# Patient Record
Sex: MALE | Race: WHITE | HISPANIC OR LATINO | URBAN - METROPOLITAN AREA
[De-identification: names, ages, dates, MRNs, and addresses within clinical notes are randomized per-mention and may not be internally consistent; named-entity substitution may affect disease eponyms.]

---

## 2019-04-02 ENCOUNTER — EMERGENCY (EMERGENCY)
Facility: HOSPITAL | Age: 59
LOS: 1 days | Discharge: HOME | End: 2019-04-02
Attending: EMERGENCY MEDICINE
Payer: MEDICAID

## 2019-04-02 VITALS
HEIGHT: 68 IN | TEMPERATURE: 98 F | SYSTOLIC BLOOD PRESSURE: 170 MMHG | WEIGHT: 182.1 LBS | HEART RATE: 56 BPM | RESPIRATION RATE: 18 BRPM | DIASTOLIC BLOOD PRESSURE: 80 MMHG | OXYGEN SATURATION: 97 %

## 2019-04-02 PROCEDURE — 99285 EMERGENCY DEPT VISIT HI MDM: CPT

## 2019-04-02 PROCEDURE — 96374 THER/PROPH/DIAG INJ IV PUSH: CPT

## 2019-04-02 PROCEDURE — T1013: CPT

## 2019-04-02 PROCEDURE — 99284 EMERGENCY DEPT VISIT MOD MDM: CPT | Mod: 25

## 2019-04-02 RX ADMIN — Medication 100 MILLIGRAM(S): at 21:17

## 2019-04-02 NOTE — ED PROVIDER NOTE - OBJECTIVE STATEMENT
Pt is a 58y M with abrasion and cellulitis to the L upper eyelid. Pt states that he is a  and was cutting grass when a small branch kicked back and hit him on the upper eyelid. Pt states he had a small scratch but there was no swelling for the first few days. as time progressed pt states that he started having redness and swelling to the area. He states it has been getting worse. He denies any injury to the eyeball/visual changes/ fever. He has not taken any medication at home. He is UTD on tetanus. No other complaints. EOMI.

## 2019-04-02 NOTE — ED PROVIDER NOTE - CHPI ED SYMPTOMS NEG
no itching/no photophobia/no purulent drainage/no blurred vision/no double vision/no drainage/no discharge

## 2019-10-29 NOTE — ED PROVIDER NOTE - THROAT, MLM
uvula midline, no vesicles, no redness, and no oropharyngeal exudate. verbal instruction/individual instruction/written material

## 2020-04-07 ENCOUNTER — EMERGENCY (EMERGENCY)
Facility: HOSPITAL | Age: 60
LOS: 1 days | Discharge: HOME | End: 2020-04-07
Attending: EMERGENCY MEDICINE
Payer: SELF-PAY

## 2020-04-07 VITALS
HEART RATE: 78 BPM | WEIGHT: 199.96 LBS | RESPIRATION RATE: 19 BRPM | TEMPERATURE: 103 F | HEIGHT: 68 IN | DIASTOLIC BLOOD PRESSURE: 70 MMHG | SYSTOLIC BLOOD PRESSURE: 128 MMHG | OXYGEN SATURATION: 94 %

## 2020-04-07 PROCEDURE — 99284 EMERGENCY DEPT VISIT MOD MDM: CPT

## 2020-04-07 NOTE — ED STATDOCS - PHYSICAL EXAMINATION
Gen: No acute distress, non toxic  HEENT: Mucous membranes moist, pink conjunctivae, EOMI  Neck: supple.   CV: RRR, nl s1/s2.  Resp: CTAB, normal rate and effort, speaking full sentences, no retractions.   GI: Abdomen soft, NT, ND. No rebound, no guarding  : No CVAT  Neuro: A&O x 3, moving all 4 extremities  MSK: No spine or joint tenderness to palpation  Skin: No rashes. intact and perfused, warm

## 2020-04-07 NOTE — ED STATDOCS - NSFOLLOWUPINSTRUCTIONS_ED_ALL_ED_FT
- Follow up with your doctor within 2-3 days.   - Take Tylenol (Acetaminophen) 650mg or Motrin (Ibuprofen/Advil) 600mg every 6 hours as needed for pain.   - Stay well hydrated.   - See attached COVID-19 instructions.      Plan to self-quarantine yourself. Avoid contact with others. Wash your hands frequently with soap and warm water for at least 20 seconds. If you develop worsening symptoms- such as respiratory distress, confusion, severe weakness, or anything new or concerning, please return to the emergency department to be evaluated.

## 2020-04-07 NOTE — ED STATDOCS - OBJECTIVE STATEMENT
58 y/o male no pmh c/o 2 weeks of fever, chills, mild headache, itchy/sore throat, myalgias and cough. States not progressing, just constant so wanted to be evaluated. Denies cp, sob, abd pain, exertional symptoms, meningeal symptoms, urinary symptoms. Taking ibuprofen intermittently, nothing recently.     ROS: + fever/chills. No eye pain/changes in vision, No ear pain/dysphagia, No chest pain/palpitations. No SOB No abdominal pain, N/V/D, no black/bloody bm. No dysuria/frequency/discharge, + headache. No Dizziness.    No rashes or breaks in skin. No numbness/tingling/weakness.

## 2020-04-07 NOTE — ED STATDOCS - PATIENT PORTAL LINK FT
You can access the FollowMyHealth Patient Portal offered by [facility] by registering at the following website: [email or web address]. By joining [employer]’s FollowMyHealth portal, you will also be able to view your health information using other applications (apps) compatible with our system.

## 2020-04-07 NOTE — ED STATDOCS - CLINICAL SUMMARY MEDICAL DECISION MAKING FREE TEXT BOX
pt with likely covid, 2 weeks of symptoms, no resp complaints, satting 94-96% on room air, no exertional desat/difficulty breathing. no change in symptoms. hydrated, otherwise well. febrile. instructed likely has covid, no testing given rationing resources.  instructed on self isolation and return precautions with supportive care.

## 2020-04-08 PROBLEM — Z78.9 OTHER SPECIFIED HEALTH STATUS: Chronic | Status: ACTIVE | Noted: 2019-04-02

## 2021-05-21 ENCOUNTER — EMERGENCY (EMERGENCY)
Facility: HOSPITAL | Age: 61
LOS: 1 days | Discharge: HOME | End: 2021-05-21
Attending: EMERGENCY MEDICINE
Payer: COMMERCIAL

## 2021-05-21 VITALS
TEMPERATURE: 98 F | SYSTOLIC BLOOD PRESSURE: 152 MMHG | OXYGEN SATURATION: 97 % | WEIGHT: 197.98 LBS | DIASTOLIC BLOOD PRESSURE: 85 MMHG | HEART RATE: 60 BPM | HEIGHT: 68 IN | RESPIRATION RATE: 16 BRPM

## 2021-05-21 PROCEDURE — 99283 EMERGENCY DEPT VISIT LOW MDM: CPT | Mod: 25

## 2021-05-21 PROCEDURE — 73564 X-RAY EXAM KNEE 4 OR MORE: CPT | Mod: 26,RT

## 2021-05-21 PROCEDURE — 73564 X-RAY EXAM KNEE 4 OR MORE: CPT

## 2021-05-21 PROCEDURE — 99283 EMERGENCY DEPT VISIT LOW MDM: CPT

## 2021-05-21 RX ORDER — IBUPROFEN 200 MG
600 TABLET ORAL ONCE
Refills: 0 | Status: COMPLETED | OUTPATIENT
Start: 2021-05-21 | End: 2021-05-21

## 2021-05-21 RX ORDER — IBUPROFEN 200 MG
1 TABLET ORAL
Qty: 20 | Refills: 0
Start: 2021-05-21 | End: 2021-05-25

## 2021-05-21 RX ADMIN — Medication 600 MILLIGRAM(S): at 15:36

## 2021-05-21 NOTE — ED PROVIDER NOTE - NS ED ROS FT
ROS: CONTUSIONAL: Denies fever, chills, fatigue, wt loss. HEAD: Denies trauma, HA, Dizziness. EYE: Denies Acute visual changes, diplopia. ENMT: Denies change in hearing, tinnitus, epistaxis, difficulty swallowing, sore throat. CARDIO: Denies CP, palpitations, edema. RESP: Denies Cough, SOB , Diff breathing, hemoptysis. GI: Denies N/V, ABD pain, change in bowel movement. URINARY: Denies difficulty urinating, pelvic pain. MS: +joint pain  Denies back pain, weakness, decreased ROM, swelling. NEURO: Denies change in gait, seizures, loss of sensation, dizziness, confusion LOC.  PSY: NO SI/HI. SKIN: Denies Rash, bruising.

## 2021-05-21 NOTE — ED PROVIDER NOTE - OBJECTIVE STATEMENT
PT with no SPMHx presents to the ED with complaint of Rt knee pain that started 1 hr PTA. Pt states that he was walking down stairs when he slipped his knee buckled and he had a sudden onset of pain in his Rt knee. Pt states that pain is moderate, dull, non radiating, constant, made worse with activity improved with rest. Pt dines fall to the ground, HA, dizziness, SOB, diff breathing, weakness, numbness, tingling, cough, SOB.

## 2021-05-21 NOTE — ED PROVIDER NOTE - ADDITIONAL NOTES AND INSTRUCTIONS:
PT was evaluated At [facility] ED and was found to have a condition that warranted time of to rest and heal from WORK/SCHOOL.   Rebel Phelan PA-C

## 2021-05-21 NOTE — ED PROVIDER NOTE - CARE PROVIDER_API CALL
Luis Ling)  Orthopaedic Surgery  [address] [place] [address]  Phone: [phone]  Fax: [phone]  Follow Up Time:

## 2021-05-21 NOTE — ED PROVIDER NOTE - PROGRESS NOTE DETAILS
Rebel Phelan PA-C: pt informed of possibility of change in radiology read after official read, PT verbalizes that he understands results.

## 2021-05-21 NOTE — ED PROVIDER NOTE - PATIENT PORTAL LINK FT
You can access the FollowMyHealth Patient Portal offered by [facility] by registering at the following website: [email or web address]. By joining [employer]’s FollowMyHealth portal, you will also be able to view your health information using other applications (apps) compatible with our system.

## 2021-05-21 NOTE — ED PROVIDER NOTE - NSFOLLOWUPINSTRUCTIONS_ED_ALL_ED_FT
Educación para el paciente: Dolor de rodilla (Conceptos Básicos)  View in English  Redactado por los médicos y editores de [employer]  ¿Cuál es la causa del dolor de rodilla?  Muchos padecimientos diferentes pueden causar dolor en las rodillas. Algunos de los más comunes se enumeran a continuación.    ?Doblar o usar mucho la rodilla – [place] puede causar un dolor en la parte delantera de la rodilla que empeora cuando corre, sube escaleras o se sienta por mucho tiempo.    ?Artritis – La artritis es un término general que significa inflamación de las articulaciones. Existen muchos tipos de artritis. El tipo más común, llamado osteoartritis, con frecuencia aparece con la edad y puede causar dolor, rigidez e inflamación (figura 1).    ?Bursitis – La bursitis se produce cuando los sacos llenos de líquido de alrededor de la rodilla (llamados “bolsas”) se irritan o se inflaman (figura 2). La bursitis puede causar dolor e inflamación.    ?Acumulación de líquido en la rodilla – [place] puede suceder después de silvia lesión de rodilla.    ?Rotura de menisco – El menisco es silvia almohadilla de material gomoso (cartílago) que se encuentra entre el hueso del muslo y el hueso de la pierna (figura 3).    ?Rotura de ligamento – Los ligamentos son bandas de tejido que conectan los huesos entre sí. Cada rodilla cuenta con 4 ligamentos (figura 3).    ?Torcedura de músculo – Varios músculos de la pierna mueven la articulación de la rodilla y hacen que la rodilla se doble y se enderece. Si january de estos músculos no funciona clement,  la rodilla puede causar dolor.    ?Otras lesiones de rodilla, silvia infección de la articulación de la rodilla o un padecimiento llamado gota, que provoca la formación de deanne en el interior de las articulaciones.    ?Padecimientos que no están relacionados con la rodilla – Por ejemplo, los problemas de cadera a veces pueden causar dolor de rodilla.    ¿Hay algo que pueda hacer por mi cuenta para sentirme mejor?  Sí. Para aliviar los síntomas, puede hacer lo siguiente:    ?Colocar hielo en la rodilla para disminuir el dolor y la inflamación – En las primeras semanas después de silvia lesión, o después de silvia actividad que empeore el dolor, puede tratar de colocarse hielo en la rodilla. Coloque un paquete de gel frío, silvia bolsa de hielo o silvia bolsa de verduras congeladas en la trupti lesionada cada 1 a 2 horas, randy 15 minutos cada vez. Coloque silvia toalla delgada entre el hielo (o el objeto frío) y rios piel. Para disminuir la inflamación, siéntese o acuéstese, y levante la pierna por encima del nivel del corazón cuando se coloque hielo.    ?Descansar la rodilla y evitar movimientos que empeoren el dolor – Trate de no agacharse, arrodillarse ni correr. Tampoco use máquinas de ejercicio, marissa escaladoras o máquinas de krista. En lugar de eso, puede caminar o nadar (en estilo crol o espalda) para hacer ejercicio.    ?Jeana silvia medicina para aliviar el dolor, marissa el paracetamol (acetaminofén) (ejemplo de parvez comercial: Tylenol) o el ibuprofeno (ejemplos de marcas comerciales: Advil, Motrin).    ¿Alberta consultar a un médico o enfermero?  Consulte a rios médico o enfermero si:    ?No puede aplicar peso a la rodilla o la rodilla se le traba o se flexiona involuntariamente    ?La rodilla está muy inflamada y siente mucho dolor    ?Tiene fiebre, dolor, inflamación y enrojecimiento en la rodilla    ?El dolor de rodilla no mejora o empeora después de tratarlo por rios cuenta randy unos días    ¿Cómo se trata el dolor de rodilla?  El tratamiento adecuado para el dolor de rodilla depende de lo que lo cause. Los tratamientos podrían incluir:    ?Usar silvia rodillera o plantillas en el calzado    ?Hacer ejercicios para fortalecer y estirar los músculos que mueven la articulación de la rodilla – Consulte a rios médico o enfermero sobre cuáles son los ejercicios que pueden ayudarlo con la causa del dolor.    ?Fisioterapia    ?Recibir silvia inyección de medicina en la rodilla    ?Otras medicinas    ?Cirugía

## 2021-05-21 NOTE — ED PROVIDER NOTE - CLINICAL SUMMARY MEDICAL DECISION MAKING FREE TEXT BOX
PT with stable VS, no acute distress, non toxic appearing, tolerating PO in the ED, Pt with no direct trauma to knee moody injury unlikely, xays neg, Pt educated that symptoms likely due to ligamentous Vs tendonous injury and the need to follow up to orhto, supportive care, crutches with modified wt bearing, educated about when to return to the ED if needed. PT verbalizes that he understands all instructions and results. Pt informed that ED is open and available 24/7 365 days a yr, encouraged to return to the ED if they have any change in condition, or feel the need for revaluation.    utilized to obtain History, ROS, Physical Exam, explanations of results and plan of care, as well as follow up instructions.

## 2021-05-21 NOTE — ED PROVIDER NOTE - ATTENDING CONTRIBUTION TO CARE
Rt knee pain that started 1 hr PTA. Pt states that he was walking down stairs when he slipped his knee buckled and he had a sudden onset of pain in his Rt knee. Pt states that pain is moderate, dull, non radiating, constant, made worse with activity improved with rest. Pt dines fall to the ground, HA, dizziness, SOB, diff breathing, weakness, numbness, tingling, cough, SOB.   xray    analgesia
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
## 2022-11-09 ENCOUNTER — OFFICE (OUTPATIENT)
Dept: URBAN - METROPOLITAN AREA CLINIC 109 | Facility: CLINIC | Age: 62
Setting detail: OPHTHALMOLOGY
End: 2022-11-09
Payer: COMMERCIAL

## 2022-11-09 DIAGNOSIS — H40.1121: ICD-10-CM

## 2022-11-09 PROCEDURE — 65855 TRABECULOPLASTY LASER SURG: CPT | Performed by: OPHTHALMOLOGY

## 2022-11-09 ASSESSMENT — TONOMETRY: OS_IOP_MMHG: 15

## 2022-11-09 ASSESSMENT — CONFRONTATIONAL VISUAL FIELD TEST (CVF)
OD_FINDINGS: FULL
OS_FINDINGS: FULL

## 2022-11-09 ASSESSMENT — VISUAL ACUITY
OS_BCVA: 20/20
OD_BCVA: 20/20-2

## 2023-01-14 ENCOUNTER — OFFICE (OUTPATIENT)
Dept: URBAN - METROPOLITAN AREA CLINIC 109 | Facility: CLINIC | Age: 63
Setting detail: OPHTHALMOLOGY
End: 2023-01-14
Payer: COMMERCIAL

## 2023-01-14 DIAGNOSIS — H40.1132: ICD-10-CM

## 2023-01-14 PROCEDURE — 99213 OFFICE O/P EST LOW 20 MIN: CPT | Performed by: OPHTHALMOLOGY

## 2023-01-14 ASSESSMENT — TONOMETRY
OD_IOP_MMHG: 15
OS_IOP_MMHG: 14

## 2023-01-14 ASSESSMENT — VISUAL ACUITY
OS_BCVA: 20/20
OD_BCVA: 20/20

## 2023-01-14 ASSESSMENT — CONFRONTATIONAL VISUAL FIELD TEST (CVF)
OD_FINDINGS: FULL
OS_FINDINGS: FULL

## 2023-03-18 ENCOUNTER — OFFICE (OUTPATIENT)
Dept: URBAN - METROPOLITAN AREA CLINIC 109 | Facility: CLINIC | Age: 63
Setting detail: OPHTHALMOLOGY
End: 2023-03-18
Payer: COMMERCIAL

## 2023-03-18 DIAGNOSIS — H25.13: ICD-10-CM

## 2023-03-18 DIAGNOSIS — H16.223: ICD-10-CM

## 2023-03-18 DIAGNOSIS — H40.1132: ICD-10-CM

## 2023-03-18 PROCEDURE — 99213 OFFICE O/P EST LOW 20 MIN: CPT | Performed by: OPHTHALMOLOGY

## 2023-03-18 ASSESSMENT — VISUAL ACUITY
OS_BCVA: 20/20
OD_BCVA: 20/20-2

## 2023-03-18 ASSESSMENT — CONFRONTATIONAL VISUAL FIELD TEST (CVF)
OD_FINDINGS: FULL
OS_FINDINGS: FULL

## 2023-03-18 ASSESSMENT — SUPERFICIAL PUNCTATE KERATITIS (SPK)
OD_SPK: 1+
OS_SPK: 1+

## 2023-05-20 ENCOUNTER — OFFICE (OUTPATIENT)
Dept: URBAN - METROPOLITAN AREA CLINIC 109 | Facility: CLINIC | Age: 63
Setting detail: OPHTHALMOLOGY
End: 2023-05-20
Payer: COMMERCIAL

## 2023-05-20 DIAGNOSIS — H40.1132: ICD-10-CM

## 2023-05-20 DIAGNOSIS — H25.13: ICD-10-CM

## 2023-05-20 PROCEDURE — 92012 INTRM OPH EXAM EST PATIENT: CPT | Performed by: OPHTHALMOLOGY

## 2023-05-20 PROCEDURE — 92083 EXTENDED VISUAL FIELD XM: CPT | Performed by: OPHTHALMOLOGY

## 2023-05-20 PROCEDURE — 92133 CPTRZD OPH DX IMG PST SGM ON: CPT | Performed by: OPHTHALMOLOGY

## 2023-05-20 ASSESSMENT — VISUAL ACUITY
OD_BCVA: 20/25-1
OS_BCVA: 20/25-1

## 2023-05-20 ASSESSMENT — TONOMETRY
OD_IOP_MMHG: 16
OS_IOP_MMHG: 16

## 2023-05-20 ASSESSMENT — SUPERFICIAL PUNCTATE KERATITIS (SPK)
OS_SPK: 1+
OD_SPK: 1+

## 2023-05-20 ASSESSMENT — CONFRONTATIONAL VISUAL FIELD TEST (CVF)
OS_FINDINGS: FULL
OD_FINDINGS: FULL

## 2023-08-19 ENCOUNTER — OFFICE (OUTPATIENT)
Dept: URBAN - METROPOLITAN AREA CLINIC 109 | Facility: CLINIC | Age: 63
Setting detail: OPHTHALMOLOGY
End: 2023-08-19
Payer: COMMERCIAL

## 2023-08-19 DIAGNOSIS — H40.1132: ICD-10-CM

## 2023-08-19 DIAGNOSIS — H25.13: ICD-10-CM

## 2023-08-19 PROCEDURE — 99213 OFFICE O/P EST LOW 20 MIN: CPT | Performed by: OPHTHALMOLOGY

## 2023-08-19 PROCEDURE — 92250 FUNDUS PHOTOGRAPHY W/I&R: CPT | Performed by: OPHTHALMOLOGY

## 2023-08-19 ASSESSMENT — CONFRONTATIONAL VISUAL FIELD TEST (CVF)
OS_FINDINGS: FULL
OD_FINDINGS: FULL

## 2023-08-19 ASSESSMENT — SUPERFICIAL PUNCTATE KERATITIS (SPK)
OS_SPK: 1+
OD_SPK: 1+

## 2023-08-19 ASSESSMENT — TONOMETRY
OS_IOP_MMHG: 15
OD_IOP_MMHG: 15

## 2023-08-19 ASSESSMENT — VISUAL ACUITY
OS_BCVA: 20/20
OD_BCVA: 20/20

## 2023-09-16 ENCOUNTER — OFFICE (OUTPATIENT)
Dept: URBAN - METROPOLITAN AREA CLINIC 109 | Facility: CLINIC | Age: 63
Setting detail: OPHTHALMOLOGY
End: 2023-09-16
Payer: COMMERCIAL

## 2023-09-16 DIAGNOSIS — H25.13: ICD-10-CM

## 2023-09-16 DIAGNOSIS — H40.1132: ICD-10-CM

## 2023-09-16 DIAGNOSIS — H10.45: ICD-10-CM

## 2023-09-16 DIAGNOSIS — H16.221: ICD-10-CM

## 2023-09-16 PROBLEM — H16.222 DRY EYE SYNDROME K SICCA; RIGHT EYE, LEFT EYE: Status: ACTIVE | Noted: 2023-09-16

## 2023-09-16 PROCEDURE — 92083 EXTENDED VISUAL FIELD XM: CPT | Performed by: OPHTHALMOLOGY

## 2023-09-16 PROCEDURE — 68761 CLOSE TEAR DUCT OPENING: CPT | Performed by: OPHTHALMOLOGY

## 2023-09-16 PROCEDURE — 99213 OFFICE O/P EST LOW 20 MIN: CPT | Performed by: OPHTHALMOLOGY

## 2023-09-16 PROCEDURE — 92133 CPTRZD OPH DX IMG PST SGM ON: CPT | Performed by: OPHTHALMOLOGY

## 2023-09-16 ASSESSMENT — TONOMETRY
OS_IOP_MMHG: 17
OD_IOP_MMHG: 16

## 2023-09-16 ASSESSMENT — CONFRONTATIONAL VISUAL FIELD TEST (CVF)
OS_FINDINGS: FULL
OD_FINDINGS: FULL

## 2023-09-16 ASSESSMENT — VISUAL ACUITY
OS_BCVA: 20/20
OD_BCVA: 20/20

## 2023-09-16 ASSESSMENT — PUNCTA - ASSESSMENT: OD_PUNCTA: 3MON PLUG

## 2023-09-16 ASSESSMENT — SUPERFICIAL PUNCTATE KERATITIS (SPK)
OD_SPK: 1+
OS_SPK: 1+

## 2023-12-09 ENCOUNTER — OFFICE (OUTPATIENT)
Dept: URBAN - METROPOLITAN AREA CLINIC 109 | Facility: CLINIC | Age: 63
Setting detail: OPHTHALMOLOGY
End: 2023-12-09
Payer: COMMERCIAL

## 2023-12-09 DIAGNOSIS — H16.223: ICD-10-CM

## 2023-12-09 DIAGNOSIS — H40.1132: ICD-10-CM

## 2023-12-09 PROCEDURE — 99213 OFFICE O/P EST LOW 20 MIN: CPT | Mod: 25 | Performed by: OPHTHALMOLOGY

## 2023-12-09 PROCEDURE — 68761 CLOSE TEAR DUCT OPENING: CPT | Mod: E2,E4 | Performed by: OPHTHALMOLOGY

## 2023-12-09 ASSESSMENT — CONFRONTATIONAL VISUAL FIELD TEST (CVF)
OD_FINDINGS: FULL
OS_FINDINGS: FULL

## 2023-12-09 ASSESSMENT — PUNCTA - ASSESSMENT: OD_PUNCTA: 3MON PLUG

## 2023-12-09 ASSESSMENT — SUPERFICIAL PUNCTATE KERATITIS (SPK)
OD_SPK: 1+
OS_SPK: 1+

## 2024-03-02 ENCOUNTER — OFFICE (OUTPATIENT)
Dept: URBAN - METROPOLITAN AREA CLINIC 109 | Facility: CLINIC | Age: 64
Setting detail: OPHTHALMOLOGY
End: 2024-03-02
Payer: COMMERCIAL

## 2024-03-02 DIAGNOSIS — H25.13: ICD-10-CM

## 2024-03-02 DIAGNOSIS — H10.45: ICD-10-CM

## 2024-03-02 DIAGNOSIS — H40.1132: ICD-10-CM

## 2024-03-02 PROCEDURE — 92083 EXTENDED VISUAL FIELD XM: CPT | Performed by: OPHTHALMOLOGY

## 2024-03-02 PROCEDURE — 92012 INTRM OPH EXAM EST PATIENT: CPT | Performed by: OPHTHALMOLOGY

## 2024-03-02 PROCEDURE — 92133 CPTRZD OPH DX IMG PST SGM ON: CPT | Performed by: OPHTHALMOLOGY

## 2024-06-22 ENCOUNTER — OFFICE (OUTPATIENT)
Dept: URBAN - METROPOLITAN AREA CLINIC 109 | Facility: CLINIC | Age: 64
Setting detail: OPHTHALMOLOGY
End: 2024-06-22
Payer: COMMERCIAL

## 2024-06-22 DIAGNOSIS — H16.223: ICD-10-CM

## 2024-06-22 DIAGNOSIS — H25.13: ICD-10-CM

## 2024-06-22 DIAGNOSIS — H40.1132: ICD-10-CM

## 2024-06-22 PROCEDURE — 68761 CLOSE TEAR DUCT OPENING: CPT | Mod: 50 | Performed by: OPHTHALMOLOGY

## 2024-06-22 PROCEDURE — 99213 OFFICE O/P EST LOW 20 MIN: CPT | Mod: 25 | Performed by: OPHTHALMOLOGY

## 2024-06-22 ASSESSMENT — CONFRONTATIONAL VISUAL FIELD TEST (CVF)
OD_FINDINGS: FULL
OS_FINDINGS: FULL

## 2024-10-19 ENCOUNTER — OFFICE (OUTPATIENT)
Dept: URBAN - METROPOLITAN AREA CLINIC 109 | Facility: CLINIC | Age: 64
Setting detail: OPHTHALMOLOGY
End: 2024-10-19
Payer: COMMERCIAL

## 2024-10-19 DIAGNOSIS — H16.223: ICD-10-CM

## 2024-10-19 DIAGNOSIS — H40.1132: ICD-10-CM

## 2024-10-19 DIAGNOSIS — H25.13: ICD-10-CM

## 2024-10-19 PROBLEM — H11.153 PINGUECULA; BOTH EYES: Status: ACTIVE | Noted: 2024-10-19

## 2024-10-19 PROCEDURE — 68761 CLOSE TEAR DUCT OPENING: CPT | Mod: 50 | Performed by: OPHTHALMOLOGY

## 2024-10-19 PROCEDURE — 99213 OFFICE O/P EST LOW 20 MIN: CPT | Mod: 25 | Performed by: OPHTHALMOLOGY

## 2024-10-19 ASSESSMENT — KERATOMETRY
OS_K2POWER_DIOPTERS: 43.50
OD_AXISANGLE_DEGREES: 096
OS_K1POWER_DIOPTERS: 42.25
OS_AXISANGLE_DEGREES: 084
OD_K1POWER_DIOPTERS: 42.00
OD_K2POWER_DIOPTERS: 43.25

## 2024-10-19 ASSESSMENT — REFRACTION_AUTOREFRACTION
OD_SPHERE: +0.25
OD_AXIS: 53
OD_CYLINDER: -0.75
OS_SPHERE: 0.00
OS_AXIS: 172
OS_CYLINDER: -1.00

## 2024-10-19 ASSESSMENT — PUNCTA - ASSESSMENT
OS_PUNCTA: 3MON PLUG
OD_PUNCTA: 3MON PLUG

## 2024-10-19 ASSESSMENT — LACRIMAL DUCT - ASSESSMENT
OS_LACRIMAL_DUCT: OASYS
OD_LACRIMAL_DUCT: OASYS

## 2024-10-19 ASSESSMENT — TONOMETRY
OS_IOP_MMHG: 16
OD_IOP_MMHG: 16

## 2024-10-19 ASSESSMENT — VISUAL ACUITY
OS_BCVA: 20/20
OD_BCVA: 20/25-2

## 2024-10-19 ASSESSMENT — CONFRONTATIONAL VISUAL FIELD TEST (CVF)
OS_FINDINGS: FULL
OD_FINDINGS: FULL

## 2024-10-19 ASSESSMENT — SUPERFICIAL PUNCTATE KERATITIS (SPK)
OS_SPK: 1+ 2+
OD_SPK: 1+ 2+

## 2024-12-14 ENCOUNTER — OFFICE (OUTPATIENT)
Dept: URBAN - METROPOLITAN AREA CLINIC 109 | Facility: CLINIC | Age: 64
Setting detail: OPHTHALMOLOGY
End: 2024-12-14
Payer: COMMERCIAL

## 2024-12-14 DIAGNOSIS — H16.223: ICD-10-CM

## 2024-12-14 DIAGNOSIS — H25.13: ICD-10-CM

## 2024-12-14 DIAGNOSIS — H11.153: ICD-10-CM

## 2024-12-14 DIAGNOSIS — H40.1132: ICD-10-CM

## 2024-12-14 PROCEDURE — 92012 INTRM OPH EXAM EST PATIENT: CPT | Performed by: OPHTHALMOLOGY

## 2024-12-14 PROCEDURE — 92020 GONIOSCOPY: CPT | Performed by: OPHTHALMOLOGY

## 2024-12-14 PROCEDURE — 92133 CPTRZD OPH DX IMG PST SGM ON: CPT | Performed by: OPHTHALMOLOGY

## 2024-12-14 ASSESSMENT — VISUAL ACUITY
OD_BCVA: 20/25
OS_BCVA: 20/20

## 2024-12-14 ASSESSMENT — KERATOMETRY
OD_K1POWER_DIOPTERS: 42.00
OS_K2POWER_DIOPTERS: 43.50
OS_AXISANGLE_DEGREES: 084
OD_K2POWER_DIOPTERS: 43.25
OS_K1POWER_DIOPTERS: 42.25
OD_AXISANGLE_DEGREES: 096

## 2024-12-14 ASSESSMENT — REFRACTION_AUTOREFRACTION
OD_AXIS: 53
OS_SPHERE: 0.00
OD_SPHERE: +0.25
OD_CYLINDER: -0.75
OS_CYLINDER: -1.00
OS_AXIS: 172

## 2024-12-14 ASSESSMENT — LACRIMAL DUCT - ASSESSMENT
OD_LACRIMAL_DUCT: OASYS
OS_LACRIMAL_DUCT: OASYS

## 2024-12-14 ASSESSMENT — CONFRONTATIONAL VISUAL FIELD TEST (CVF)
OD_FINDINGS: FULL
OS_FINDINGS: FULL

## 2024-12-14 ASSESSMENT — PUNCTA - ASSESSMENT
OD_PUNCTA: 3MON PLUG
OS_PUNCTA: 3MON PLUG

## 2024-12-14 ASSESSMENT — SUPERFICIAL PUNCTATE KERATITIS (SPK)
OD_SPK: 1+ 2+
OS_SPK: 1+ 2+

## 2024-12-14 ASSESSMENT — TONOMETRY
OD_IOP_MMHG: 16
OS_IOP_MMHG: 15

## 2025-01-06 NOTE — ED PROVIDER NOTE - CPE EDP CARDIAC NORM
Detail Level: Detailed Size Of Lesion: 3x3 mm Size Of Lesion: 14x8mm Size Of Lesion: 6x5mm normal...

## 2025-03-19 ENCOUNTER — OFFICE (OUTPATIENT)
Dept: URBAN - METROPOLITAN AREA CLINIC 109 | Facility: CLINIC | Age: 65
Setting detail: OPHTHALMOLOGY
End: 2025-03-19
Payer: COMMERCIAL

## 2025-03-19 DIAGNOSIS — H11.153: ICD-10-CM

## 2025-03-19 DIAGNOSIS — H25.13: ICD-10-CM

## 2025-03-19 DIAGNOSIS — H16.223: ICD-10-CM

## 2025-03-19 DIAGNOSIS — H40.1132: ICD-10-CM

## 2025-03-19 PROCEDURE — 68761 CLOSE TEAR DUCT OPENING: CPT | Mod: 50 | Performed by: OPHTHALMOLOGY

## 2025-03-19 PROCEDURE — 92250 FUNDUS PHOTOGRAPHY W/I&R: CPT | Performed by: OPHTHALMOLOGY

## 2025-03-19 PROCEDURE — 92014 COMPRE OPH EXAM EST PT 1/>: CPT | Mod: 25 | Performed by: OPHTHALMOLOGY

## 2025-03-19 ASSESSMENT — PUNCTA - ASSESSMENT
OD_PUNCTA: 3MON PLUG
OS_PUNCTA: 3MON PLUG

## 2025-03-19 ASSESSMENT — TONOMETRY
OS_IOP_MMHG: 16
OD_IOP_MMHG: 15

## 2025-03-19 ASSESSMENT — REFRACTION_AUTOREFRACTION
OD_SPHERE: -0.25
OD_AXIS: 038
OS_CYLINDER: -0.75
OS_SPHERE: -0.25
OD_CYLINDER: -0.25
OS_AXIS: 132

## 2025-03-19 ASSESSMENT — LACRIMAL DUCT - ASSESSMENT
OS_LACRIMAL_DUCT: OASYS .3
OD_LACRIMAL_DUCT: OASYS .3

## 2025-03-19 ASSESSMENT — SUPERFICIAL PUNCTATE KERATITIS (SPK)
OD_SPK: 1+ 2+
OS_SPK: 1+ 2+

## 2025-03-19 ASSESSMENT — CONFRONTATIONAL VISUAL FIELD TEST (CVF)
OS_FINDINGS: FULL
OD_FINDINGS: FULL

## 2025-03-19 ASSESSMENT — KERATOMETRY
OD_AXISANGLE_DEGREES: 096
OD_K2POWER_DIOPTERS: 43.25
OS_K1POWER_DIOPTERS: 42.25
OS_AXISANGLE_DEGREES: 084
OS_K2POWER_DIOPTERS: 43.50
OD_K1POWER_DIOPTERS: 42.00

## 2025-03-19 ASSESSMENT — VISUAL ACUITY
OD_BCVA: 20/25
OS_BCVA: 20/25

## 2025-07-16 ENCOUNTER — OFFICE (OUTPATIENT)
Dept: URBAN - METROPOLITAN AREA CLINIC 109 | Facility: CLINIC | Age: 65
Setting detail: OPHTHALMOLOGY
End: 2025-07-16
Payer: COMMERCIAL

## 2025-07-16 ENCOUNTER — RX ONLY (RX ONLY)
Age: 65
End: 2025-07-16

## 2025-07-16 DIAGNOSIS — H25.13: ICD-10-CM

## 2025-07-16 DIAGNOSIS — H40.1132: ICD-10-CM

## 2025-07-16 DIAGNOSIS — H11.153: ICD-10-CM

## 2025-07-16 PROCEDURE — 92012 INTRM OPH EXAM EST PATIENT: CPT | Performed by: OPHTHALMOLOGY

## 2025-07-16 PROCEDURE — 92133 CPTRZD OPH DX IMG PST SGM ON: CPT | Performed by: OPHTHALMOLOGY

## 2025-07-16 ASSESSMENT — VISUAL ACUITY
OS_BCVA: 20/25
OD_BCVA: 20/25

## 2025-07-16 ASSESSMENT — REFRACTION_AUTOREFRACTION
OS_AXIS: 132
OD_AXIS: 038
OD_SPHERE: -0.25
OS_SPHERE: -0.25
OD_CYLINDER: -0.25
OS_CYLINDER: -0.75

## 2025-07-16 ASSESSMENT — LACRIMAL DUCT - ASSESSMENT
OD_LACRIMAL_DUCT: OASYS .3
OS_LACRIMAL_DUCT: OASYS .3

## 2025-07-16 ASSESSMENT — KERATOMETRY
OS_K1POWER_DIOPTERS: 42.25
OD_AXISANGLE_DEGREES: 096
OS_AXISANGLE_DEGREES: 084
OS_K2POWER_DIOPTERS: 43.50
OD_K1POWER_DIOPTERS: 42.00
OD_K2POWER_DIOPTERS: 43.25

## 2025-07-16 ASSESSMENT — CONFRONTATIONAL VISUAL FIELD TEST (CVF)
OD_FINDINGS: FULL
OS_FINDINGS: FULL

## 2025-07-16 ASSESSMENT — SUPERFICIAL PUNCTATE KERATITIS (SPK)
OD_SPK: 1+ 2+
OS_SPK: 1+ 2+

## 2025-07-16 ASSESSMENT — TONOMETRY
OS_IOP_MMHG: 17
OD_IOP_MMHG: 17

## 2025-07-16 ASSESSMENT — PUNCTA - ASSESSMENT
OS_PUNCTA: 3MON PLUG
OD_PUNCTA: 3MON PLUG